# Patient Record
Sex: FEMALE | Race: WHITE | NOT HISPANIC OR LATINO
[De-identification: names, ages, dates, MRNs, and addresses within clinical notes are randomized per-mention and may not be internally consistent; named-entity substitution may affect disease eponyms.]

---

## 2017-05-12 ENCOUNTER — APPOINTMENT (OUTPATIENT)
Dept: PLASTIC SURGERY | Facility: CLINIC | Age: 62
End: 2017-05-12

## 2017-05-12 VITALS
HEIGHT: 59 IN | TEMPERATURE: 98.5 F | SYSTOLIC BLOOD PRESSURE: 122 MMHG | DIASTOLIC BLOOD PRESSURE: 84 MMHG | HEART RATE: 67 BPM | WEIGHT: 163 LBS | BODY MASS INDEX: 32.86 KG/M2

## 2017-06-09 ENCOUNTER — APPOINTMENT (OUTPATIENT)
Dept: PLASTIC SURGERY | Facility: CLINIC | Age: 62
End: 2017-06-09

## 2017-11-10 ENCOUNTER — APPOINTMENT (OUTPATIENT)
Dept: PLASTIC SURGERY | Facility: CLINIC | Age: 62
End: 2017-11-10
Payer: COMMERCIAL

## 2017-11-10 PROCEDURE — 99213 OFFICE O/P EST LOW 20 MIN: CPT | Mod: 25

## 2017-11-10 PROCEDURE — 20550 NJX 1 TENDON SHEATH/LIGAMENT: CPT | Mod: 50

## 2017-12-08 ENCOUNTER — APPOINTMENT (OUTPATIENT)
Dept: PLASTIC SURGERY | Facility: CLINIC | Age: 62
End: 2017-12-08

## 2018-02-09 ENCOUNTER — APPOINTMENT (OUTPATIENT)
Dept: PLASTIC SURGERY | Facility: CLINIC | Age: 63
End: 2018-02-09
Payer: MEDICARE

## 2018-02-09 PROCEDURE — 99213 OFFICE O/P EST LOW 20 MIN: CPT

## 2018-02-14 ENCOUNTER — APPOINTMENT (OUTPATIENT)
Dept: ORTHOPEDIC SURGERY | Facility: CLINIC | Age: 63
End: 2018-02-14
Payer: MEDICARE

## 2018-02-14 DIAGNOSIS — Z78.9 OTHER SPECIFIED HEALTH STATUS: ICD-10-CM

## 2018-02-14 DIAGNOSIS — E03.9 HYPOTHYROIDISM, UNSPECIFIED: ICD-10-CM

## 2018-02-14 DIAGNOSIS — Z82.49 FAMILY HISTORY OF ISCHEMIC HEART DISEASE AND OTHER DISEASES OF THE CIRCULATORY SYSTEM: ICD-10-CM

## 2018-02-14 DIAGNOSIS — Z82.69 FAMILY HISTORY OF OTHER DISEASES OF THE MUSCULOSKELETAL SYSTEM AND CONNECTIVE TISSUE: ICD-10-CM

## 2018-02-14 PROCEDURE — 99214 OFFICE O/P EST MOD 30 MIN: CPT

## 2018-02-14 PROCEDURE — 73564 X-RAY EXAM KNEE 4 OR MORE: CPT | Mod: RT

## 2018-03-20 ENCOUNTER — APPOINTMENT (OUTPATIENT)
Dept: ORTHOPEDIC SURGERY | Facility: CLINIC | Age: 63
End: 2018-03-20
Payer: MEDICARE

## 2018-03-20 PROCEDURE — 20610 DRAIN/INJ JOINT/BURSA W/O US: CPT | Mod: 50

## 2018-04-17 ENCOUNTER — OUTPATIENT (OUTPATIENT)
Dept: OUTPATIENT SERVICES | Facility: HOSPITAL | Age: 63
LOS: 1 days | End: 2018-04-17
Payer: MEDICARE

## 2018-04-17 VITALS
WEIGHT: 167.99 LBS | HEIGHT: 58.5 IN | TEMPERATURE: 97 F | HEART RATE: 64 BPM | OXYGEN SATURATION: 98 % | RESPIRATION RATE: 20 BRPM | DIASTOLIC BLOOD PRESSURE: 80 MMHG | SYSTOLIC BLOOD PRESSURE: 140 MMHG

## 2018-04-17 DIAGNOSIS — Z98.890 OTHER SPECIFIED POSTPROCEDURAL STATES: Chronic | ICD-10-CM

## 2018-04-17 DIAGNOSIS — M65.312 TRIGGER THUMB, LEFT THUMB: ICD-10-CM

## 2018-04-17 DIAGNOSIS — I10 ESSENTIAL (PRIMARY) HYPERTENSION: ICD-10-CM

## 2018-04-17 DIAGNOSIS — J45.909 UNSPECIFIED ASTHMA, UNCOMPLICATED: ICD-10-CM

## 2018-04-17 DIAGNOSIS — K21.9 GASTRO-ESOPHAGEAL REFLUX DISEASE WITHOUT ESOPHAGITIS: ICD-10-CM

## 2018-04-17 DIAGNOSIS — M65.30 TRIGGER FINGER, UNSPECIFIED FINGER: ICD-10-CM

## 2018-04-17 LAB
BUN SERPL-MCNC: 15 MG/DL — SIGNIFICANT CHANGE UP (ref 7–23)
CALCIUM SERPL-MCNC: 9.8 MG/DL — SIGNIFICANT CHANGE UP (ref 8.4–10.5)
CHLORIDE SERPL-SCNC: 101 MMOL/L — SIGNIFICANT CHANGE UP (ref 98–107)
CO2 SERPL-SCNC: 27 MMOL/L — SIGNIFICANT CHANGE UP (ref 22–31)
CREAT SERPL-MCNC: 0.71 MG/DL — SIGNIFICANT CHANGE UP (ref 0.5–1.3)
GLUCOSE SERPL-MCNC: 94 MG/DL — SIGNIFICANT CHANGE UP (ref 70–99)
HCT VFR BLD CALC: 38.5 % — SIGNIFICANT CHANGE UP (ref 34.5–45)
HGB BLD-MCNC: 12.6 G/DL — SIGNIFICANT CHANGE UP (ref 11.5–15.5)
MCHC RBC-ENTMCNC: 29.7 PG — SIGNIFICANT CHANGE UP (ref 27–34)
MCHC RBC-ENTMCNC: 32.7 % — SIGNIFICANT CHANGE UP (ref 32–36)
MCV RBC AUTO: 90.8 FL — SIGNIFICANT CHANGE UP (ref 80–100)
NRBC # FLD: 0 — SIGNIFICANT CHANGE UP
PLATELET # BLD AUTO: 289 K/UL — SIGNIFICANT CHANGE UP (ref 150–400)
PMV BLD: 10.5 FL — SIGNIFICANT CHANGE UP (ref 7–13)
POTASSIUM SERPL-MCNC: 4.6 MMOL/L — SIGNIFICANT CHANGE UP (ref 3.5–5.3)
POTASSIUM SERPL-SCNC: 4.6 MMOL/L — SIGNIFICANT CHANGE UP (ref 3.5–5.3)
RBC # BLD: 4.24 M/UL — SIGNIFICANT CHANGE UP (ref 3.8–5.2)
RBC # FLD: 13.5 % — SIGNIFICANT CHANGE UP (ref 10.3–14.5)
SODIUM SERPL-SCNC: 142 MMOL/L — SIGNIFICANT CHANGE UP (ref 135–145)
WBC # BLD: 4.5 K/UL — SIGNIFICANT CHANGE UP (ref 3.8–10.5)
WBC # FLD AUTO: 4.5 K/UL — SIGNIFICANT CHANGE UP (ref 3.8–10.5)

## 2018-04-17 PROCEDURE — 93010 ELECTROCARDIOGRAM REPORT: CPT

## 2018-04-17 NOTE — H&P PST ADULT - HISTORY OF PRESENT ILLNESS
Pt is a 63 y.o. female ; pt reports h/o trigger thumbs bilaterally x 1 year . Pt reports bilateral pain ; pt to surgeon ; see plan os care Pt is a 63 y.o. female ; pt reports h/o trigger thumbs bilaterally x 1 year . Pt reports bilateral pain ; pt to surgeon ; see plan of care

## 2018-04-17 NOTE — H&P PST ADULT - LYMPHATIC
posterior cervical R/anterior cervical R/supraclavicular R/anterior cervical L/posterior cervical L/supraclavicular L

## 2018-04-17 NOTE — H&P PST ADULT - PROBLEM SELECTOR PLAN 1
Procedure as booked ; Left Trigger Finger Release ; pt states " I called  surgeons office 4/16/18; I want to change it to the right"  ; call to surgeons office ; s/w Devang Siddiqi to s/w surgeon     Pre op instructions ; given to pt pt appears to have a good understanding of pre op instructions Procedure as booked ; Left Trigger Finger Release ; pt states " I called  surgeons office 4/16/18; I want to change it to the right"  ; call to surgeons office ; s/w Devang Siddiqi to s/w surgeon   Pt to sign consent with surgeon dos     Pre op instructions ; given to pt pt appears to have a good understanding of pre op instructions

## 2018-04-17 NOTE — H&P PST ADULT - PMH
Asthma  " r/t allergies"  GERD (gastroesophageal reflux disease)    HTN (hypertension)    Hypothyroidism Arthritis    Asthma  " r/t allergies"  GERD (gastroesophageal reflux disease)    HTN (hypertension)    Hypothyroidism

## 2018-04-17 NOTE — H&P PST ADULT - FAMILY HISTORY
Father  Still living? No  Family history of oral cancer, Age at diagnosis: Age Unknown     Mother  Still living? Yes, Estimated age: Age Unknown  Family history of hypertension, Age at diagnosis: Age Unknown

## 2018-04-17 NOTE — H&P PST ADULT - NSANTHOSAYNRD_GEN_A_CORE
No. FADUMO screening performed.  STOP BANG Legend: 0-2 = LOW Risk; 3-4 = INTERMEDIATE Risk; 5-8 = HIGH Risk

## 2018-04-25 ENCOUNTER — TRANSCRIPTION ENCOUNTER (OUTPATIENT)
Age: 63
End: 2018-04-25

## 2018-04-26 ENCOUNTER — OUTPATIENT (OUTPATIENT)
Dept: OUTPATIENT SERVICES | Facility: HOSPITAL | Age: 63
LOS: 1 days | Discharge: HOME | End: 2018-04-26
Payer: MEDICARE

## 2018-04-26 VITALS
TEMPERATURE: 98 F | RESPIRATION RATE: 18 BRPM | HEIGHT: 58.5 IN | SYSTOLIC BLOOD PRESSURE: 118 MMHG | WEIGHT: 167.99 LBS | HEART RATE: 71 BPM | OXYGEN SATURATION: 97 % | DIASTOLIC BLOOD PRESSURE: 67 MMHG

## 2018-04-26 VITALS
RESPIRATION RATE: 48 BRPM | HEART RATE: 73 BPM | DIASTOLIC BLOOD PRESSURE: 71 MMHG | SYSTOLIC BLOOD PRESSURE: 104 MMHG | OXYGEN SATURATION: 97 %

## 2018-04-26 DIAGNOSIS — M65.312 TRIGGER THUMB, LEFT THUMB: ICD-10-CM

## 2018-04-26 DIAGNOSIS — Z98.890 OTHER SPECIFIED POSTPROCEDURAL STATES: Chronic | ICD-10-CM

## 2018-04-26 PROCEDURE — 26055 INCISE FINGER TENDON SHEATH: CPT | Mod: FA

## 2018-04-26 NOTE — ASU DISCHARGE PLAN (ADULT/PEDIATRIC). - NOTIFY
Bleeding that does not stop/Pain not relieved by Medications/Swelling that continues/Numbness, color, or temperature change to extremity/Fever greater than 101

## 2018-05-04 ENCOUNTER — TRANSCRIPTION ENCOUNTER (OUTPATIENT)
Age: 63
End: 2018-05-04

## 2018-05-04 ENCOUNTER — APPOINTMENT (OUTPATIENT)
Dept: PLASTIC SURGERY | Facility: CLINIC | Age: 63
End: 2018-05-04
Payer: MEDICARE

## 2018-05-04 PROCEDURE — 99024 POSTOP FOLLOW-UP VISIT: CPT

## 2018-05-11 ENCOUNTER — APPOINTMENT (OUTPATIENT)
Dept: PLASTIC SURGERY | Facility: CLINIC | Age: 63
End: 2018-05-11
Payer: MEDICARE

## 2018-05-11 PROCEDURE — 99024 POSTOP FOLLOW-UP VISIT: CPT

## 2018-06-08 ENCOUNTER — APPOINTMENT (OUTPATIENT)
Dept: PLASTIC SURGERY | Facility: CLINIC | Age: 63
End: 2018-06-08

## 2018-06-11 ENCOUNTER — OUTPATIENT (OUTPATIENT)
Dept: OUTPATIENT SERVICES | Facility: HOSPITAL | Age: 63
LOS: 1 days | End: 2018-06-11
Payer: MEDICARE

## 2018-06-11 ENCOUNTER — APPOINTMENT (OUTPATIENT)
Dept: MAMMOGRAPHY | Facility: IMAGING CENTER | Age: 63
End: 2018-06-11
Payer: MEDICARE

## 2018-06-11 DIAGNOSIS — Z98.890 OTHER SPECIFIED POSTPROCEDURAL STATES: Chronic | ICD-10-CM

## 2018-06-11 DIAGNOSIS — Z00.8 ENCOUNTER FOR OTHER GENERAL EXAMINATION: ICD-10-CM

## 2018-06-11 PROCEDURE — 77067 SCR MAMMO BI INCL CAD: CPT

## 2018-06-11 PROCEDURE — 77067 SCR MAMMO BI INCL CAD: CPT | Mod: 26

## 2018-06-11 PROCEDURE — 77063 BREAST TOMOSYNTHESIS BI: CPT

## 2018-06-11 PROCEDURE — 77063 BREAST TOMOSYNTHESIS BI: CPT | Mod: 26

## 2018-07-31 PROBLEM — M19.90 UNSPECIFIED OSTEOARTHRITIS, UNSPECIFIED SITE: Chronic | Status: ACTIVE | Noted: 2018-04-17

## 2018-07-31 PROBLEM — K21.9 GASTRO-ESOPHAGEAL REFLUX DISEASE WITHOUT ESOPHAGITIS: Chronic | Status: ACTIVE | Noted: 2018-04-17

## 2018-07-31 PROBLEM — J45.909 UNSPECIFIED ASTHMA, UNCOMPLICATED: Chronic | Status: ACTIVE | Noted: 2018-04-17

## 2018-07-31 PROBLEM — I10 ESSENTIAL (PRIMARY) HYPERTENSION: Chronic | Status: ACTIVE | Noted: 2018-04-17

## 2018-07-31 PROBLEM — E03.9 HYPOTHYROIDISM, UNSPECIFIED: Chronic | Status: ACTIVE | Noted: 2018-04-17

## 2018-08-08 ENCOUNTER — APPOINTMENT (OUTPATIENT)
Dept: ULTRASOUND IMAGING | Facility: HOSPITAL | Age: 63
End: 2018-08-08
Payer: MEDICARE

## 2018-08-08 ENCOUNTER — OUTPATIENT (OUTPATIENT)
Dept: OUTPATIENT SERVICES | Facility: HOSPITAL | Age: 63
LOS: 1 days | End: 2018-08-08
Payer: MEDICARE

## 2018-08-08 DIAGNOSIS — I10 ESSENTIAL (PRIMARY) HYPERTENSION: ICD-10-CM

## 2018-08-08 DIAGNOSIS — R00.2 PALPITATIONS: ICD-10-CM

## 2018-08-08 DIAGNOSIS — Z00.00 ENCOUNTER FOR GENERAL ADULT MEDICAL EXAMINATION WITHOUT ABNORMAL FINDINGS: ICD-10-CM

## 2018-08-08 DIAGNOSIS — E78.5 HYPERLIPIDEMIA, UNSPECIFIED: ICD-10-CM

## 2018-08-08 DIAGNOSIS — Z98.890 OTHER SPECIFIED POSTPROCEDURAL STATES: Chronic | ICD-10-CM

## 2018-08-08 PROCEDURE — 93272 ECG/REVIEW INTERPRET ONLY: CPT

## 2018-08-08 PROCEDURE — 93880 EXTRACRANIAL BILAT STUDY: CPT | Mod: 26

## 2018-08-08 PROCEDURE — 93880 EXTRACRANIAL BILAT STUDY: CPT

## 2018-08-14 ENCOUNTER — FORM ENCOUNTER (OUTPATIENT)
Age: 63
End: 2018-08-14

## 2018-09-25 ENCOUNTER — APPOINTMENT (OUTPATIENT)
Dept: PLASTIC SURGERY | Facility: CLINIC | Age: 63
End: 2018-09-25
Payer: MEDICARE

## 2018-09-25 DIAGNOSIS — M65.311 TRIGGER THUMB, RIGHT THUMB: ICD-10-CM

## 2018-09-25 PROCEDURE — 11900 INJECT SKIN LESIONS </W 7: CPT

## 2018-09-25 PROCEDURE — 99212 OFFICE O/P EST SF 10 MIN: CPT | Mod: 25

## 2018-09-26 PROBLEM — M65.311 TRIGGER FINGER OF RIGHT THUMB: Status: ACTIVE | Noted: 2017-05-15

## 2018-09-26 RX ORDER — MONTELUKAST 10 MG/1
10 TABLET, FILM COATED ORAL
Qty: 30 | Refills: 0 | Status: ACTIVE | COMMUNITY
Start: 2018-09-04

## 2018-09-26 RX ORDER — OMEPRAZOLE 20 MG/1
20 CAPSULE, DELAYED RELEASE ORAL
Qty: 30 | Refills: 0 | Status: ACTIVE | COMMUNITY
Start: 2018-07-20

## 2018-09-26 RX ORDER — ALBUTEROL SULFATE 90 UG/1
108 (90 BASE) AEROSOL, METERED RESPIRATORY (INHALATION)
Qty: 8 | Refills: 0 | Status: ACTIVE | COMMUNITY
Start: 2018-07-20

## 2018-09-26 RX ORDER — LOSARTAN POTASSIUM 50 MG/1
50 TABLET, FILM COATED ORAL
Qty: 90 | Refills: 0 | Status: ACTIVE | COMMUNITY
Start: 2018-09-06

## 2018-09-26 RX ORDER — VALSARTAN 80 MG/1
80 TABLET, COATED ORAL
Qty: 90 | Refills: 0 | Status: ACTIVE | COMMUNITY
Start: 2018-05-24

## 2018-09-26 RX ORDER — LEVOTHYROXINE SODIUM 0.05 MG/1
50 TABLET ORAL
Qty: 90 | Refills: 0 | Status: ACTIVE | COMMUNITY
Start: 2018-09-04

## 2018-09-26 RX ORDER — FLUTICASONE PROPIONATE 110 UG/1
110 AEROSOL, METERED RESPIRATORY (INHALATION)
Qty: 12 | Refills: 0 | Status: ACTIVE | COMMUNITY
Start: 2018-07-20

## 2018-09-26 RX ORDER — OXYCODONE AND ACETAMINOPHEN 5; 325 MG/1; MG/1
5-325 TABLET ORAL
Qty: 20 | Refills: 0 | Status: ACTIVE | COMMUNITY
Start: 2018-04-26

## 2018-10-09 NOTE — H&P PST ADULT - LIVES WITH, PROFILE
yes/Pt. profile reviewed, consulted with KYLEE MACK prior to initial PT evaluation and tx, as per RN, Pt. is OK to participate in skilled therapy session.
spouse

## 2018-11-02 ENCOUNTER — APPOINTMENT (OUTPATIENT)
Dept: PLASTIC SURGERY | Facility: CLINIC | Age: 63
End: 2018-11-02
Payer: MEDICARE

## 2018-11-02 DIAGNOSIS — M65.312 TRIGGER THUMB, RIGHT THUMB: ICD-10-CM

## 2018-11-02 DIAGNOSIS — M65.311 TRIGGER THUMB, RIGHT THUMB: ICD-10-CM

## 2018-11-02 DIAGNOSIS — M65.312 TRIGGER THUMB, LEFT THUMB: ICD-10-CM

## 2018-11-02 DIAGNOSIS — L91.0 HYPERTROPHIC SCAR: ICD-10-CM

## 2018-11-02 PROCEDURE — 99212 OFFICE O/P EST SF 10 MIN: CPT | Mod: 25

## 2018-11-02 PROCEDURE — 11900 INJECT SKIN LESIONS </W 7: CPT

## 2018-11-20 PROBLEM — L91.0 HYPERTROPHIC SCAR: Status: ACTIVE | Noted: 2018-09-26

## 2018-11-20 PROBLEM — M65.311 BILATERAL TRIGGER THUMB: Status: RESOLVED | Noted: 2017-05-18 | Resolved: 2018-11-20

## 2018-11-20 PROBLEM — M65.311 TRIGGER THUMB OF BOTH HANDS: Status: RESOLVED | Noted: 2017-05-12 | Resolved: 2018-11-20

## 2018-11-20 PROBLEM — M65.312 TRIGGER FINGER OF LEFT THUMB: Status: RESOLVED | Noted: 2017-05-15 | Resolved: 2018-11-20

## 2018-12-14 ENCOUNTER — APPOINTMENT (OUTPATIENT)
Dept: PLASTIC SURGERY | Facility: CLINIC | Age: 63
End: 2018-12-14

## 2018-12-28 ENCOUNTER — OUTPATIENT (OUTPATIENT)
Dept: OUTPATIENT SERVICES | Facility: HOSPITAL | Age: 63
LOS: 1 days | End: 2018-12-28
Payer: MEDICARE

## 2018-12-28 ENCOUNTER — APPOINTMENT (OUTPATIENT)
Dept: RADIOLOGY | Facility: IMAGING CENTER | Age: 63
End: 2018-12-28
Payer: MEDICARE

## 2018-12-28 DIAGNOSIS — Z98.890 OTHER SPECIFIED POSTPROCEDURAL STATES: Chronic | ICD-10-CM

## 2018-12-28 DIAGNOSIS — Z00.8 ENCOUNTER FOR OTHER GENERAL EXAMINATION: ICD-10-CM

## 2018-12-28 PROCEDURE — 77080 DXA BONE DENSITY AXIAL: CPT

## 2018-12-28 PROCEDURE — 77080 DXA BONE DENSITY AXIAL: CPT | Mod: 26

## 2019-01-09 ENCOUNTER — APPOINTMENT (OUTPATIENT)
Dept: ORTHOPEDIC SURGERY | Facility: CLINIC | Age: 64
End: 2019-01-09
Payer: MEDICARE

## 2019-01-09 PROCEDURE — 20610 DRAIN/INJ JOINT/BURSA W/O US: CPT | Mod: 50

## 2019-01-09 PROCEDURE — 99213 OFFICE O/P EST LOW 20 MIN: CPT | Mod: 25

## 2019-01-09 NOTE — DISCUSSION/SUMMARY
[de-identified] : She elected to receive a steroid injection to both knees today, and she tolerated them well.

## 2019-01-09 NOTE — HISTORY OF PRESENT ILLNESS
[All Other ROS Normal] : All other review of systems are negative except as noted [Joint Pain] : joint pain [Joint Stiffness] : no joint stiffness [Joint Swelling] : no joint swelling [Muscle Aches] : no muscle aches [FreeTextEntry1] : Right and left knee pain [FreeTextEntry2] : Followup right and left knee pain secondary to arthritis has been undergoing periodic steroid injections with sustained relief. Complains of recurrent chronic intermittent pain right and left knee with weightbearing to no swelling locking giving out

## 2019-01-09 NOTE — PHYSICAL EXAM
[FreeTextEntry2] : Well-nourished female, in no acute distress\par Alert and oriented to time, place and person\par Skin: no lesions discoloration\par Respirations: unlabored\par Cardiac: no leg swelling\par Lymphatic: no groin adenopathy \par Right knee: No effusion flexion 0/110 with crepitus Ligaments intact\par Left knee: No effusion flexion 0/110 with crepitus Ligaments intact

## 2019-01-09 NOTE — ADDENDUM
[FreeTextEntry1] : I, Jason Brittnee, acted solely as a scribe for Dr. Sarwat Gaines on 01/09/2019 .\par \par All medical record entries made by the scribe were at my, Dr. Sarwat Gaines, direction and personally dictated by me on 01/09/2019 . I have reviewed the chart and agree that the record accurately reflects my personal performance of the history, physical exam, assessment and plan. I have also personally directed, reviewed, and agreed with the chart.

## 2019-06-12 ENCOUNTER — APPOINTMENT (OUTPATIENT)
Dept: MAMMOGRAPHY | Facility: IMAGING CENTER | Age: 64
End: 2019-06-12
Payer: MEDICARE

## 2019-06-12 ENCOUNTER — OUTPATIENT (OUTPATIENT)
Dept: OUTPATIENT SERVICES | Facility: HOSPITAL | Age: 64
LOS: 1 days | End: 2019-06-12
Payer: MEDICARE

## 2019-06-12 DIAGNOSIS — Z00.8 ENCOUNTER FOR OTHER GENERAL EXAMINATION: ICD-10-CM

## 2019-06-12 DIAGNOSIS — Z98.890 OTHER SPECIFIED POSTPROCEDURAL STATES: Chronic | ICD-10-CM

## 2019-06-12 PROCEDURE — 77067 SCR MAMMO BI INCL CAD: CPT | Mod: 26

## 2019-06-12 PROCEDURE — 77063 BREAST TOMOSYNTHESIS BI: CPT

## 2019-06-12 PROCEDURE — 77063 BREAST TOMOSYNTHESIS BI: CPT | Mod: 26

## 2019-06-12 PROCEDURE — 77067 SCR MAMMO BI INCL CAD: CPT

## 2019-08-07 ENCOUNTER — TRANSCRIPTION ENCOUNTER (OUTPATIENT)
Age: 64
End: 2019-08-07

## 2019-08-09 ENCOUNTER — APPOINTMENT (OUTPATIENT)
Dept: ORTHOPEDIC SURGERY | Facility: CLINIC | Age: 64
End: 2019-08-09
Payer: MEDICARE

## 2019-08-09 VITALS — WEIGHT: 163 LBS | HEIGHT: 59 IN | BODY MASS INDEX: 32.86 KG/M2

## 2019-08-09 PROCEDURE — 20610 DRAIN/INJ JOINT/BURSA W/O US: CPT | Mod: 50

## 2019-08-09 NOTE — ADDENDUM
[FreeTextEntry1] : I, Adria Morrow, acted solely as a scribe for Dr. Sarwat Gaines on 08/09/2019  .\par  \par All medical record entries made by the scribe were at my, Dr. Sarwat Gaines, direction and personally dictated by me on 08/09/2019. I have reviewed the chart and agree that the record accurately reflects my personal performance of the history, physical exam, assessment and plan. I have also personally directed, reviewed, and agreed with the chart.\par

## 2019-08-09 NOTE — PHYSICAL EXAM
[de-identified] : Well-nourished, in no acute distress\par Alert and oriented to time, place and person\par Skin: no lesions discoloration\par Respirations: unlabored\par Cardiac: no leg swelling\par Lymphatic: no groin adenopathy\par right and left knee: varus alignment, peripheral pulses intact, flexion 0-100 degrees, ligaments intact [de-identified] : No new images were taken in the office today.\par  [FreeTextEntry2] : Well-nourished female, in no acute distress\par Alert and oriented to time, place and person\par Skin: no lesions discoloration\par Respirations: unlabored\par Cardiac: no leg swelling\par Lymphatic: no groin adenopathy \par Right knee: No effusion flexion 0/110 with crepitus Ligaments intact\par Left knee: No effusion flexion 0/110 with crepitus Ligaments intact

## 2019-08-09 NOTE — PROCEDURE
[de-identified] : Right and left knee sterilely prepped for her injection 8 cc Xylocaine 1 cc Kenalog. Well tolerated

## 2019-08-09 NOTE — HISTORY OF PRESENT ILLNESS
[All Other ROS Normal] : All other review of systems are negative except as noted [Joint Pain] : joint pain [de-identified] : 63 year old female presents with a longstanding history of  recurrent bilateral knee pain secondary to osteoarthritis. She continues with medial pain to both knees that worsens with walking and climbing stairs. History of - cortisone injection in January of 2019 that provided sustained relief of pain in the bilateral knees.  Reports no swelling, locking or giving out.  [Joint Stiffness] : no joint stiffness [Joint Swelling] : no joint swelling [Muscle Aches] : no muscle aches [FreeTextEntry2] : 63 year old female presents with a longstanding history of bilateral knee pain secondary to osteoarthritis. She continues with medial pain to both knees that worsens with walking and climbing stairs. She saw Dr. Munoz in August, and had cortisone injections, but she did had minimal relief of pain. She has also had hyaluronic acid injections in the past as well. She is going on a trip, and wants to consider steroid injections today.  [FreeTextEntry1] : Right and left knee pain

## 2020-06-15 ENCOUNTER — OUTPATIENT (OUTPATIENT)
Dept: OUTPATIENT SERVICES | Facility: HOSPITAL | Age: 65
LOS: 1 days | End: 2020-06-15
Payer: MEDICARE

## 2020-06-15 ENCOUNTER — APPOINTMENT (OUTPATIENT)
Dept: MAMMOGRAPHY | Facility: IMAGING CENTER | Age: 65
End: 2020-06-15
Payer: MEDICARE

## 2020-06-15 DIAGNOSIS — Z00.8 ENCOUNTER FOR OTHER GENERAL EXAMINATION: ICD-10-CM

## 2020-06-15 DIAGNOSIS — Z98.890 OTHER SPECIFIED POSTPROCEDURAL STATES: Chronic | ICD-10-CM

## 2020-06-15 PROCEDURE — 77063 BREAST TOMOSYNTHESIS BI: CPT | Mod: 26

## 2020-06-15 PROCEDURE — 77067 SCR MAMMO BI INCL CAD: CPT | Mod: 26

## 2020-06-15 PROCEDURE — 77063 BREAST TOMOSYNTHESIS BI: CPT

## 2020-06-15 PROCEDURE — 77067 SCR MAMMO BI INCL CAD: CPT

## 2020-06-17 ENCOUNTER — APPOINTMENT (OUTPATIENT)
Dept: ORTHOPEDIC SURGERY | Facility: CLINIC | Age: 65
End: 2020-06-17
Payer: MEDICARE

## 2020-06-17 DIAGNOSIS — M17.12 UNILATERAL PRIMARY OSTEOARTHRITIS, LEFT KNEE: ICD-10-CM

## 2020-06-17 PROCEDURE — 99213 OFFICE O/P EST LOW 20 MIN: CPT

## 2020-06-17 PROCEDURE — 73564 X-RAY EXAM KNEE 4 OR MORE: CPT | Mod: 50

## 2020-06-17 RX ORDER — DICLOFENAC SODIUM AND MISOPROSTOL 75; 200 MG/1; UG/1
75-0.2 TABLET, DELAYED RELEASE ORAL
Qty: 60 | Refills: 1 | Status: ACTIVE | COMMUNITY
Start: 2020-06-17 | End: 1900-01-01

## 2020-06-17 NOTE — DISCUSSION/SUMMARY
[Medication Risks Reviewed] : Medication risks reviewed [Surgical risks reviewed] : Surgical risks reviewed [de-identified] : Impression: end stage osteoarthritis bilateral knees\par Plan: Trial of diclofenac, followup if not improved for steroid injections as per patient's request. I have advised patient she is a candidate for total knee replacement

## 2020-06-17 NOTE — ADDENDUM
[FreeTextEntry1] : I, Triny Ji ATC, assisted with the history and documentation for Dr. Gaines on this date 06/17/2020.\par

## 2020-06-17 NOTE — HISTORY OF PRESENT ILLNESS
[All Other ROS Normal] : All other review of systems are negative except as noted [Joint Pain] : joint pain [de-identified] : 63 year old female presents with a longstanding history of  recurrent bilateral knee pain secondary to osteoarthritis. She continues to have medial pain to both knees that worsens with walking and climbing stairs. History of cortisone injections in January of 2019 and August 2019. The injections only give her a few weeks of symptom relief each time. [Joint Stiffness] : no joint stiffness [Joint Swelling] : no joint swelling [Muscle Aches] : no muscle aches

## 2020-06-17 NOTE — PHYSICAL EXAM
[de-identified] : Well-nourished, in no acute distress\par Alert and oriented to time, place and person\par Skin: no lesions discoloration\par Respirations: unlabored\par Cardiac: no leg swelling\par Lymphatic: no groin adenopathy\par Motor power: Peroneals, EHL, and tibialis anterior strength 5/5 bilaterally\par Right knee: neutral no effusion 0/110, ligaments intact\par Left knee: neutral no effusion 0/115, ligaments intact [de-identified] : Xray 4 views L and R knees: bilateral osteoarthritis, bone on bone, subchondral sclerosis, varus alignment

## 2020-12-29 ENCOUNTER — APPOINTMENT (OUTPATIENT)
Dept: RADIOLOGY | Facility: IMAGING CENTER | Age: 65
End: 2020-12-29
Payer: MEDICARE

## 2020-12-29 ENCOUNTER — OUTPATIENT (OUTPATIENT)
Dept: OUTPATIENT SERVICES | Facility: HOSPITAL | Age: 65
LOS: 1 days | End: 2020-12-29
Payer: MEDICARE

## 2020-12-29 DIAGNOSIS — Z98.890 OTHER SPECIFIED POSTPROCEDURAL STATES: Chronic | ICD-10-CM

## 2020-12-29 DIAGNOSIS — Z00.8 ENCOUNTER FOR OTHER GENERAL EXAMINATION: ICD-10-CM

## 2020-12-29 PROCEDURE — 77080 DXA BONE DENSITY AXIAL: CPT

## 2020-12-29 PROCEDURE — 77080 DXA BONE DENSITY AXIAL: CPT | Mod: 26

## 2021-02-12 ENCOUNTER — OUTPATIENT (OUTPATIENT)
Dept: OUTPATIENT SERVICES | Facility: HOSPITAL | Age: 66
LOS: 1 days | End: 2021-02-12
Payer: MEDICARE

## 2021-02-12 ENCOUNTER — APPOINTMENT (OUTPATIENT)
Dept: ULTRASOUND IMAGING | Facility: IMAGING CENTER | Age: 66
End: 2021-02-12
Payer: MEDICARE

## 2021-02-12 ENCOUNTER — APPOINTMENT (OUTPATIENT)
Dept: MAMMOGRAPHY | Facility: IMAGING CENTER | Age: 66
End: 2021-02-12
Payer: MEDICARE

## 2021-02-12 DIAGNOSIS — Z98.890 OTHER SPECIFIED POSTPROCEDURAL STATES: Chronic | ICD-10-CM

## 2021-02-12 DIAGNOSIS — Z12.39 ENCOUNTER FOR OTHER SCREENING FOR MALIGNANT NEOPLASM OF BREAST: ICD-10-CM

## 2021-02-12 DIAGNOSIS — Z12.31 ENCOUNTER FOR SCREENING MAMMOGRAM FOR MALIGNANT NEOPLASM OF BREAST: ICD-10-CM

## 2021-02-12 DIAGNOSIS — Z00.8 ENCOUNTER FOR OTHER GENERAL EXAMINATION: ICD-10-CM

## 2021-02-12 PROCEDURE — G0279: CPT | Mod: 26

## 2021-02-12 PROCEDURE — 76641 ULTRASOUND BREAST COMPLETE: CPT | Mod: 26,50

## 2021-02-12 PROCEDURE — 77065 DX MAMMO INCL CAD UNI: CPT

## 2021-02-12 PROCEDURE — G0279: CPT

## 2021-02-12 PROCEDURE — 77065 DX MAMMO INCL CAD UNI: CPT | Mod: 26,RT

## 2021-02-12 PROCEDURE — 76641 ULTRASOUND BREAST COMPLETE: CPT

## 2021-02-12 PROCEDURE — 77066 DX MAMMO INCL CAD BI: CPT

## 2021-03-22 ENCOUNTER — APPOINTMENT (OUTPATIENT)
Dept: RADIOLOGY | Facility: IMAGING CENTER | Age: 66
End: 2021-03-22

## 2021-04-20 NOTE — REASON FOR VISIT
CC:  Diagnoses of Abdominal fullness, Other form of dyspnea, Other sleep apnea, Left flank pain, Nonrheumatic aortic valve insufficiency, Generalized abdominal pain, Screening for colon cancer, and Muscle spasm were pertinent to this visit.    HISTORY OF THE PRESENT ILLNESS: Patient is a 59 y.o. female. This pleasant patient is here today follow-up ER visit.    ER note from 4/14/2021 reviewed.  Patient states after her Covid vaccine she had  fever (101) which did resolve.  Though then she says she developed dyspnea, fatigue, sense of abdominal distention/bloating, cough.  Work-up in ER was not very remarkable.  Chest x-ray within normal limits.  ECG normal sinus rhythm without any acute changes suggestive of ischemia.  Labs showed GFR 53 with prior range 50-60.  Reasonable CBC and CMP, negative troponin, normal D-dimer 0.28.  Prior to this on 4/13/2021 she did have E. coli urine [id number] which was pansensitive and she did complete antibiotic therapy for this; and at this time also tested negative for Covid.    She says dyspnea continues throughout the day, even talking makes her dyspneic.  Prior to her Covid vaccine she said she did a 4 mile hike and felt fine so this is a significant change for her.  Echocardiogram in August did show moderate aortic regurgitation.  She states compliance with her new diagnosis of sleep apnea with CPAP treatment.  Denies wheeze, leg edema, PND orthopnea.  Does have fatigue over this timeframe.  Has chronic dry cough sometimes some scant clear mucus.  Using cough suppressant as needed.  More remotely she had CT-CTA chest 8/29/2020 showing bilateral peripheral lung nodules thought to be postinflammatory and she has followed with pulmonary regarding this.    With the abdominal distention sensation seems to be concentrated over the left lower region.  She says her interval surveillance colonoscopy is probably due this year.  States bowel movements are daily generally soft and brown and  no blood.  Denies nausea or vomiting.  Has chronic GERD controlled on current therapy.  No dysphagia, nausea, vomiting, fevers.  She describes the distention as constant sense of feeling like she needs to use the restroom.  More remote CT abdomen and pelvis 8/6/2019 did show signs hepatic steatosis and hepatomegaly as well as prior cholecystectomy.  Does have history of recurrent episodes of zoster but has had no rash in this distribution.  Does have some pain radiating however from the left abdominal region towards left flank area.  She has constant sense of needing to urinate but no blood in urine, no dysuria, no bladder fullness.  Again she did complete antibiotics for UTI as above.    Allergies: Patient has no known allergies.    Current Outpatient Medications Ordered in Epic   Medication Sig Dispense Refill   • baclofen (LIORESAL) 5 MG Tab Take 1-2 Tablets by mouth 3 times a day as needed for up to 30 days. 30 tablet 1   • fluticasone (FLOVENT HFA) 44 MCG/ACT Aerosol Inhale 1 Puff 2 times a day. 1 Each 3   • albuterol 108 (90 Base) MCG/ACT Aero Soln inhalation aerosol Inhale 2 Puffs every four hours as needed for Shortness of Breath. 1 Each 3   • ibuprofen (MOTRIN) 200 MG Tab Take 400 mg by mouth every 6 hours as needed for Mild Pain.     • benzonatate (TESSALON) 100 MG Cap Take 2 Capsules by mouth 3 times a day as needed. (Patient taking differently: Take 200 mg by mouth 3 times a day as needed for Cough.) 60 capsule 0   • traZODone (DESYREL) 50 MG Tab TAKE 1 TABLET BY MOUTH AT BEDTIME AS NEEDED (Patient taking differently: Take 50 mg by mouth at bedtime.) 90 tablet 1   • omeprazole (PRILOSEC) 40 MG delayed-release capsule Take 1 Cap by mouth as needed. Indications: Heartburn 90 Cap 3   • Cyanocobalamin (B-12 PO) Take 1 tablet by mouth every evening.     • acetaminophen (TYLENOL) 500 MG Tab Take 1,000 mg by mouth every 6 hours as needed for Moderate Pain.     • therapeutic multivitamin-minerals (THERAGRAN-M)  "Tab Take 1 tablet by mouth every evening.       No current Epic-ordered facility-administered medications on file.       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   • OTHER ORTHOPEDIC SURGERY      b/l knee replacements ,    • TONSILLECTOMY         Social History     Tobacco Use   • Smoking status: Former Smoker     Packs/day: 2.00     Years: 16.00     Pack years: 32.00     Quit date:      Years since quittin.3   • Smokeless tobacco: Never Used   Substance Use Topics   • Alcohol use: Not Currently     Comment: RARELY   • Drug use: Not Currently     Types: Inhaled     Comment: Marijuana       Social History     Social History Narrative   • Not on file       Family History   Problem Relation Age of Onset   • Arthritis Mother    • Cancer Mother         skin   • Anemia Mother    • Hypertension Mother    • Obesity Mother    • Other Mother         ulcer   • Cancer Father         skin, prostate, esophageal   • Hypertension Father    • Obesity Father    • Arthritis Brother    • Cancer Maternal Aunt         breast]   • Sleep Apnea Neg Hx        Exam: /74 (BP Location: Right arm, Patient Position: Sitting)   Pulse 96   Temp 36.2 °C (97.2 °F) (Temporal)   Resp 16   Ht 1.676 m (5' 6\")   Wt 106 kg (233 lb)   SpO2 96%  Body mass index is 37.61 kg/m².    General: Normal appearing. No distress.  EYES: Conjunctiva clear lids without " ptosis, pupils equal  EARS: Normal shape and contour   Pulmonary: Clear to ausculation.  Normal effort. No rales or wheezing.  Cardiovascular: Regular rate and rhythm   Abdomen: Soft, I do not appreciate focal distention.  No rebound/guarding/rigidity, bowel sounds present, there was tenderness palpating moderately in epigastric, mid abdomen and throughout the lower quadrants.  Liver auscultated to be enlarged about 2 cm below costal's, difficult for me to palpate spleen.  No fluid wave appreciated.  No rash or skin changes.  Neurologic: Cranial nerves grossly nonfocal  Skin: Warm and dry.  No obvious lesions.  Musculoskeletal: Normal gait. No extremity cyanosis, clubbing, or edema.  Palpating back there is muscle tension focally left lower and no skin rash.  Psych: Normal mood and affect. Alert and oriented x3. Judgment and insight is normal.      Assessment/Plan  1. Abdominal fullness  Unclear cause of her symptoms.  Plan to check abdominal imaging to rule out any significant splenomegaly, new mass, etc.  Has regular bowel movements but KUB may be helpful to evaluate for stool burden.  She is due for interval surveillance colonoscopy, referral to GI as well.  - US-ABDOMEN COMPLETE SURVEY; Future  - URINALYSIS; Future  - URINE CULTURE(NEW); Future    2. Other form of dyspnea  Plan to evaluate symptoms further and empiric trial of inhalers.  Will reevaluate her aortic regurgitation which was moderate on prior echocardiogram.  Lung function testing to see if she has any signs of obstructive or restrictive airway disease.  Known to pulmonary team she may follow-up for additional recommendations.  ER shows negative D-dimer, pulmonary embolus is not thought to be very likely.  - PULMONARY FUNCTION TESTS -Test requested: Spirometry with-out & with Bronchodilator; Future  - REFERRAL TO PULMONARY AND SLEEP MEDICINE  - fluticasone (FLOVENT HFA) 44 MCG/ACT Aerosol; Inhale 1 Puff 2 times a day.  Dispense: 1 Each; Refill: 3  -  albuterol 108 (90 Base) MCG/ACT Aero Soln inhalation aerosol; Inhale 2 Puffs every four hours as needed for Shortness of Breath.  Dispense: 1 Each; Refill: 3    3. Other sleep apnea  Continue CPAP and follow-up pulmonary team    4. Left flank pain  On exam she did have some focal muscle spasm on the left side.  We will try muscle relaxer.  But with her symptoms radiating from her left pain through her abdomen and urinary frequency will perform KUB evaluate for any occult stone, stool burden causing abdominal distention, etc.  - [id number] VIEW; Future    5. Nonrheumatic aortic valve insufficiency  Repeat echocardiogram as above  - EC-ECHOCARDIOGRAM COMPLETE W/O CONT; Future    6. Generalized abdominal pain  See discussion above  - REFERRAL TO GASTROENTEROLOGY    7. Screening for colon cancer  Patient states due for interval surveillance colonoscopy  - REFERRAL TO GASTROENTEROLOGY    8. Muscle spasm  Patient aware that muscle relaxers can be sedating do not mix alcohol, driving, machinery, etc. she expressed understanding.  She has taken baclofen in the past.  - baclofen (LIORESAL) 5 MG Tab; Take 1-2 Tablets by mouth 3 times a day as needed for up to 30 days.  Dispense: 30 tablet; Refill: 1    RTC 2 weeks review interval results          Please note that this dictation was created using voice recognition software. I have made every reasonable attempt to correct obvious errors, but I expect that there are errors of grammar and possibly content that I did not discover before finalizing the note.     [Follow-Up Visit] : a follow-up visit for [Knee Pain] : knee pain [FreeTextEntry2] :  pain right and left knees

## 2021-06-09 ENCOUNTER — APPOINTMENT (OUTPATIENT)
Dept: MRI IMAGING | Facility: CLINIC | Age: 66
End: 2021-06-09
Payer: MEDICARE

## 2021-06-09 ENCOUNTER — OUTPATIENT (OUTPATIENT)
Dept: OUTPATIENT SERVICES | Facility: HOSPITAL | Age: 66
LOS: 1 days | End: 2021-06-09
Payer: MEDICARE

## 2021-06-09 DIAGNOSIS — M54.5 LOW BACK PAIN: ICD-10-CM

## 2021-06-09 DIAGNOSIS — Z98.890 OTHER SPECIFIED POSTPROCEDURAL STATES: Chronic | ICD-10-CM

## 2021-06-09 PROCEDURE — 72148 MRI LUMBAR SPINE W/O DYE: CPT

## 2021-06-09 PROCEDURE — 72148 MRI LUMBAR SPINE W/O DYE: CPT | Mod: 26,MH

## 2022-02-14 ENCOUNTER — APPOINTMENT (OUTPATIENT)
Dept: ULTRASOUND IMAGING | Facility: IMAGING CENTER | Age: 67
End: 2022-02-14
Payer: MEDICARE

## 2022-02-14 ENCOUNTER — OUTPATIENT (OUTPATIENT)
Dept: OUTPATIENT SERVICES | Facility: HOSPITAL | Age: 67
LOS: 1 days | End: 2022-02-14
Payer: MEDICARE

## 2022-02-14 ENCOUNTER — APPOINTMENT (OUTPATIENT)
Dept: MAMMOGRAPHY | Facility: IMAGING CENTER | Age: 67
End: 2022-02-14
Payer: MEDICARE

## 2022-02-14 DIAGNOSIS — Z98.890 OTHER SPECIFIED POSTPROCEDURAL STATES: Chronic | ICD-10-CM

## 2022-02-14 DIAGNOSIS — Z00.8 ENCOUNTER FOR OTHER GENERAL EXAMINATION: ICD-10-CM

## 2022-02-14 PROCEDURE — 77063 BREAST TOMOSYNTHESIS BI: CPT

## 2022-02-14 PROCEDURE — 77063 BREAST TOMOSYNTHESIS BI: CPT | Mod: 26

## 2022-02-14 PROCEDURE — 76641 ULTRASOUND BREAST COMPLETE: CPT | Mod: 26,50

## 2022-02-14 PROCEDURE — 77067 SCR MAMMO BI INCL CAD: CPT

## 2022-02-14 PROCEDURE — 77067 SCR MAMMO BI INCL CAD: CPT | Mod: 26

## 2022-02-14 PROCEDURE — 76641 ULTRASOUND BREAST COMPLETE: CPT

## 2022-12-30 ENCOUNTER — APPOINTMENT (OUTPATIENT)
Dept: RADIOLOGY | Facility: IMAGING CENTER | Age: 67
End: 2022-12-30
Payer: MEDICARE

## 2022-12-30 ENCOUNTER — OUTPATIENT (OUTPATIENT)
Dept: OUTPATIENT SERVICES | Facility: HOSPITAL | Age: 67
LOS: 1 days | End: 2022-12-30
Payer: MEDICARE

## 2022-12-30 DIAGNOSIS — Z98.890 OTHER SPECIFIED POSTPROCEDURAL STATES: Chronic | ICD-10-CM

## 2022-12-30 DIAGNOSIS — Z00.8 ENCOUNTER FOR OTHER GENERAL EXAMINATION: ICD-10-CM

## 2022-12-30 PROCEDURE — 77080 DXA BONE DENSITY AXIAL: CPT | Mod: 26

## 2022-12-30 PROCEDURE — 77080 DXA BONE DENSITY AXIAL: CPT

## 2022-12-30 NOTE — PROCEDURE
Addended by: RITO PEPPER on: 12/30/2022 02:13 PM     Modules accepted: Orders    
[de-identified] : Right and left knee sterilely prepped for her injection a cc Xylocaine 1 cc Kenalog. Well tolerated

## 2023-02-17 ENCOUNTER — OUTPATIENT (OUTPATIENT)
Dept: OUTPATIENT SERVICES | Facility: HOSPITAL | Age: 68
LOS: 1 days | End: 2023-02-17
Payer: MEDICARE

## 2023-02-17 ENCOUNTER — APPOINTMENT (OUTPATIENT)
Dept: MAMMOGRAPHY | Facility: IMAGING CENTER | Age: 68
End: 2023-02-17
Payer: MEDICARE

## 2023-02-17 DIAGNOSIS — Z98.890 OTHER SPECIFIED POSTPROCEDURAL STATES: Chronic | ICD-10-CM

## 2023-02-17 DIAGNOSIS — Z00.8 ENCOUNTER FOR OTHER GENERAL EXAMINATION: ICD-10-CM

## 2023-02-17 PROCEDURE — 77063 BREAST TOMOSYNTHESIS BI: CPT | Mod: 26

## 2023-02-17 PROCEDURE — 77067 SCR MAMMO BI INCL CAD: CPT

## 2023-02-17 PROCEDURE — 77063 BREAST TOMOSYNTHESIS BI: CPT

## 2023-02-17 PROCEDURE — 77067 SCR MAMMO BI INCL CAD: CPT | Mod: 26

## 2023-02-21 ENCOUNTER — NON-APPOINTMENT (OUTPATIENT)
Age: 68
End: 2023-02-21

## 2023-02-23 ENCOUNTER — APPOINTMENT (OUTPATIENT)
Dept: ULTRASOUND IMAGING | Facility: IMAGING CENTER | Age: 68
End: 2023-02-23
Payer: MEDICARE

## 2023-02-23 ENCOUNTER — OUTPATIENT (OUTPATIENT)
Dept: OUTPATIENT SERVICES | Facility: HOSPITAL | Age: 68
LOS: 1 days | End: 2023-02-23
Payer: MEDICARE

## 2023-02-23 DIAGNOSIS — Z00.8 ENCOUNTER FOR OTHER GENERAL EXAMINATION: ICD-10-CM

## 2023-02-23 DIAGNOSIS — Z98.890 OTHER SPECIFIED POSTPROCEDURAL STATES: Chronic | ICD-10-CM

## 2023-02-23 PROCEDURE — 76641 ULTRASOUND BREAST COMPLETE: CPT

## 2023-02-23 PROCEDURE — 76641 ULTRASOUND BREAST COMPLETE: CPT | Mod: 26,50,GY

## 2023-03-01 ENCOUNTER — APPOINTMENT (OUTPATIENT)
Dept: ORTHOPEDIC SURGERY | Facility: CLINIC | Age: 68
End: 2023-03-01
Payer: MEDICARE

## 2023-03-01 PROCEDURE — 73564 X-RAY EXAM KNEE 4 OR MORE: CPT | Mod: RT

## 2023-03-01 PROCEDURE — 99213 OFFICE O/P EST LOW 20 MIN: CPT

## 2023-03-01 NOTE — PHYSICAL EXAM
[de-identified] : Constitutional:Well nourished , well developed and in no acute distress\par Psychiatric: Alert and oriented to time place and person.Appropriate affect \par Skin:Head, neck, arms and lower extremities:no lesions or discoloration\par HEENT: Normocephalic, EOM intact, Nasal septum midline,\par Respiratory: Unlabored respirations,no audible wheezing ,no tachypnea, no cyanosis\par Cardiovascular: no leg swelling  no ankle edema no JVD, pulse regular\par Vascular: no calf or thigh tenderness, \par Peripheral pulses; intact\par Lymphatics:No groin adenopathy,no lymphedema lower  or upper extremities\par Right knee effusion 1+ flexion 0/110 ligaments intact peripheral pulses intact [de-identified] : X-rays right knee 4 views weightbearing demonstrate tricompartmental arthritis with medial compartment subchondral sclerosis joint space loss bone-on-bone contact varus alignment

## 2023-03-01 NOTE — HISTORY OF PRESENT ILLNESS
[de-identified] : 67y/o female presents with worsening right knee pain. Patient reports she twisted her right knee 2 weeks ago.Getting out of a car.  She began experience intermittent pain anterior and popliteal aspect of right knee provoked by weightbearing She also has longstanding history of recurrent bilateral knee pain secondary to osteoarthritis. She continues to have medial pain to bilateral knees. Pain is worsened with walking and climbing stairs. \par History of cortisone injections in January of 2019 and August 2019. She reports injections have given her only a few weeks of symptom relief each time. \par \par

## 2023-03-01 NOTE — DISCUSSION/SUMMARY
[de-identified] : Impression osteoarthritis right knee was with acute synovitis\par Plan NSAIDs Tylenol physical therapy follow-up 1 month

## 2023-03-29 ENCOUNTER — APPOINTMENT (OUTPATIENT)
Dept: ORTHOPEDIC SURGERY | Facility: CLINIC | Age: 68
End: 2023-03-29
Payer: MEDICARE

## 2023-03-29 VITALS — HEIGHT: 59 IN | WEIGHT: 158 LBS | BODY MASS INDEX: 31.85 KG/M2

## 2023-03-29 DIAGNOSIS — M17.11 UNILATERAL PRIMARY OSTEOARTHRITIS, RIGHT KNEE: ICD-10-CM

## 2023-03-29 PROCEDURE — 99213 OFFICE O/P EST LOW 20 MIN: CPT

## 2023-03-29 NOTE — HISTORY OF PRESENT ILLNESS
[de-identified] : Patient seen in follow-up for osteoarthritis left knee she has been undergoing physical therapy and reports decreased symptom severity

## 2023-03-29 NOTE — PHYSICAL EXAM
[de-identified] : Constitutional:Well nourished , well developed and in no acute distress\par Psychiatric: Alert and oriented to time place and person.Appropriate affect \par Skin:Head, neck, arms and lower extremities:no lesions or discoloration\par HEENT: Normocephalic, EOM intact, Nasal septum midline,\par Respiratory: Unlabored respirations,no audible wheezing ,no tachypnea, no cyanosis\par Cardiovascular: no leg swelling  no ankle edema no JVD, pulse regular\par Vascular: no calf or thigh tenderness, \par Peripheral pulses; intact\par Lymphatics:No groin adenopathy,no lymphedema lower  or upper extremities\par Right knee effusion 1+ flexion 0/110 ligaments intact peripheral pulses intact

## 2023-03-29 NOTE — DISCUSSION/SUMMARY
[de-identified] : Impression osteoarthritis right knee was with acute synovitis Improved\par PlanFollow-up 6 months

## 2023-05-02 NOTE — H&P PST ADULT - PAIN RATING AT REST
Topical Metronidazole Counseling: Metronidazole is a topical antibiotic medication. You may experience burning, stinging, redness, or allergic reactions.  Please call our office if you develop any problems from using this medication. 6

## 2023-09-28 ENCOUNTER — OUTPATIENT (OUTPATIENT)
Dept: OUTPATIENT SERVICES | Facility: HOSPITAL | Age: 68
LOS: 1 days | End: 2023-09-28

## 2023-09-28 ENCOUNTER — APPOINTMENT (OUTPATIENT)
Dept: RADIOLOGY | Facility: HOSPITAL | Age: 68
End: 2023-09-28
Payer: MEDICARE

## 2023-09-28 DIAGNOSIS — Z98.890 OTHER SPECIFIED POSTPROCEDURAL STATES: Chronic | ICD-10-CM

## 2023-09-28 DIAGNOSIS — K44.9 DIAPHRAGMATIC HERNIA WITHOUT OBSTRUCTION OR GANGRENE: ICD-10-CM

## 2023-09-28 DIAGNOSIS — R13.10 DYSPHAGIA, UNSPECIFIED: ICD-10-CM

## 2023-09-28 DIAGNOSIS — R12 HEARTBURN: ICD-10-CM

## 2023-09-28 PROCEDURE — 74220 X-RAY XM ESOPHAGUS 1CNTRST: CPT | Mod: 26

## 2023-10-11 ENCOUNTER — APPOINTMENT (OUTPATIENT)
Dept: ORTHOPEDIC SURGERY | Facility: CLINIC | Age: 68
End: 2023-10-11
Payer: MEDICARE

## 2023-10-11 PROCEDURE — 99213 OFFICE O/P EST LOW 20 MIN: CPT

## 2023-11-22 PROBLEM — I10 HYPERTENSION: Status: ACTIVE | Noted: 2017-05-12

## 2023-11-23 PROBLEM — K44.9 HIATAL HERNIA: Status: ACTIVE | Noted: 2023-11-22

## 2023-11-30 ENCOUNTER — NON-APPOINTMENT (OUTPATIENT)
Age: 68
End: 2023-11-30

## 2023-11-30 ENCOUNTER — APPOINTMENT (OUTPATIENT)
Dept: THORACIC SURGERY | Facility: CLINIC | Age: 68
End: 2023-11-30
Payer: MEDICARE

## 2023-11-30 VITALS
OXYGEN SATURATION: 99 % | DIASTOLIC BLOOD PRESSURE: 84 MMHG | HEART RATE: 77 BPM | RESPIRATION RATE: 17 BRPM | BODY MASS INDEX: 31.45 KG/M2 | WEIGHT: 156 LBS | SYSTOLIC BLOOD PRESSURE: 122 MMHG | HEIGHT: 59 IN

## 2023-11-30 DIAGNOSIS — Z86.59 PERSONAL HISTORY OF OTHER MENTAL AND BEHAVIORAL DISORDERS: ICD-10-CM

## 2023-11-30 DIAGNOSIS — K44.9 DIAPHRAGMATIC HERNIA W/OUT OBSTRUCTION OR GANGRENE: ICD-10-CM

## 2023-11-30 DIAGNOSIS — Z98.890 OTHER SPECIFIED POSTPROCEDURAL STATES: ICD-10-CM

## 2023-11-30 DIAGNOSIS — Z87.09 PERSONAL HISTORY OF OTHER DISEASES OF THE RESPIRATORY SYSTEM: ICD-10-CM

## 2023-11-30 DIAGNOSIS — Z87.891 PERSONAL HISTORY OF NICOTINE DEPENDENCE: ICD-10-CM

## 2023-11-30 DIAGNOSIS — M65.319 TRIGGER THUMB, UNSPECIFIED THUMB: ICD-10-CM

## 2023-11-30 DIAGNOSIS — I10 ESSENTIAL (PRIMARY) HYPERTENSION: ICD-10-CM

## 2023-11-30 DIAGNOSIS — Z87.828 PERSONAL HISTORY OF OTHER (HEALED) PHYSICAL INJURY AND TRAUMA: ICD-10-CM

## 2023-11-30 PROCEDURE — 99205 OFFICE O/P NEW HI 60 MIN: CPT

## 2023-12-01 ENCOUNTER — NON-APPOINTMENT (OUTPATIENT)
Age: 68
End: 2023-12-01

## 2023-12-01 PROBLEM — Z87.09 HISTORY OF ASTHMA: Status: RESOLVED | Noted: 2023-12-01 | Resolved: 2023-12-01

## 2023-12-01 PROBLEM — M65.319 TRIGGER THUMB: Status: RESOLVED | Noted: 2023-12-01 | Resolved: 2023-12-01

## 2023-12-01 PROBLEM — Z87.891 FORMER SMOKER: Status: ACTIVE | Noted: 2017-05-12

## 2023-12-01 PROBLEM — Z87.828 HISTORY OF TORN MENISCUS OF KNEE: Status: RESOLVED | Noted: 2023-12-01 | Resolved: 2023-12-01

## 2023-12-01 PROBLEM — Z86.59 HISTORY OF ANXIETY: Status: RESOLVED | Noted: 2023-12-01 | Resolved: 2023-12-01

## 2023-12-01 PROBLEM — Z98.890 H/O DILATION AND CURETTAGE: Status: RESOLVED | Noted: 2023-12-01 | Resolved: 2023-12-01

## 2023-12-01 RX ORDER — SERTRALINE HYDROCHLORIDE 150 MG/1
CAPSULE ORAL
Refills: 0 | Status: ACTIVE | COMMUNITY

## 2024-02-29 ENCOUNTER — APPOINTMENT (OUTPATIENT)
Dept: ULTRASOUND IMAGING | Facility: IMAGING CENTER | Age: 69
End: 2024-02-29
Payer: MEDICARE

## 2024-02-29 ENCOUNTER — APPOINTMENT (OUTPATIENT)
Dept: MAMMOGRAPHY | Facility: IMAGING CENTER | Age: 69
End: 2024-02-29
Payer: MEDICARE

## 2024-02-29 ENCOUNTER — OUTPATIENT (OUTPATIENT)
Dept: OUTPATIENT SERVICES | Facility: HOSPITAL | Age: 69
LOS: 1 days | End: 2024-02-29
Payer: MEDICARE

## 2024-02-29 DIAGNOSIS — Z98.890 OTHER SPECIFIED POSTPROCEDURAL STATES: Chronic | ICD-10-CM

## 2024-02-29 DIAGNOSIS — Z00.8 ENCOUNTER FOR OTHER GENERAL EXAMINATION: ICD-10-CM

## 2024-02-29 PROCEDURE — 76641 ULTRASOUND BREAST COMPLETE: CPT | Mod: 26,50,GY

## 2024-02-29 PROCEDURE — 77067 SCR MAMMO BI INCL CAD: CPT

## 2024-02-29 PROCEDURE — 77063 BREAST TOMOSYNTHESIS BI: CPT | Mod: 26

## 2024-02-29 PROCEDURE — 77063 BREAST TOMOSYNTHESIS BI: CPT

## 2024-02-29 PROCEDURE — 77067 SCR MAMMO BI INCL CAD: CPT | Mod: 26

## 2024-02-29 PROCEDURE — 76641 ULTRASOUND BREAST COMPLETE: CPT

## 2024-04-10 ENCOUNTER — APPOINTMENT (OUTPATIENT)
Dept: ORTHOPEDIC SURGERY | Facility: CLINIC | Age: 69
End: 2024-04-10
Payer: MEDICARE

## 2024-04-10 VITALS — HEIGHT: 59 IN | BODY MASS INDEX: 30.24 KG/M2 | WEIGHT: 150 LBS

## 2024-04-10 DIAGNOSIS — M16.11 UNILATERAL PRIMARY OSTEOARTHRITIS, RIGHT HIP: ICD-10-CM

## 2024-04-10 DIAGNOSIS — M17.12 UNILATERAL PRIMARY OSTEOARTHRITIS, LEFT KNEE: ICD-10-CM

## 2024-04-10 DIAGNOSIS — M17.11 UNILATERAL PRIMARY OSTEOARTHRITIS, RIGHT KNEE: ICD-10-CM

## 2024-04-10 PROCEDURE — 99214 OFFICE O/P EST MOD 30 MIN: CPT

## 2024-04-10 PROCEDURE — 73564 X-RAY EXAM KNEE 4 OR MORE: CPT | Mod: 50

## 2024-04-10 NOTE — ADDENDUM
[FreeTextEntry1] : I, Thi Ugarte, acted solely as a scribe for Dr. Seymour Gaines MD on this date  04/10/2024  All medical record entries made by the Scribe were at my, Dr. Seymour Gaines MD , direction and personally dictated by me on 04/10/2024. I have reviewed the chart and agree that the record accurately reflects my personal performance of the history, physical exam, assessment and plan. I have also personally directed, reviewed, and agreed with the chart.

## 2024-04-10 NOTE — HISTORY OF PRESENT ILLNESS
[de-identified] : DENZEL ARAGON is a 69 year old female who presents for follow up evaluation of osteoarthritis right knee. Patient presents ambulating independently. She has been undergoing physical therapy and reports decreased symptom severity. She reports pain is worse with going down the stairs. Occasional Advil with mild to moderate relief. Reports knee pain is manageable. Since her last visit on 10/11/2023, she reports worsening right knee pain. She's exacerbated all conservative management options of treatment with no relief. She would like to discuss surgery for ~September.

## 2024-04-10 NOTE — PHYSICAL EXAM
[de-identified] : Constitutional:Well nourished , well developed and in no acute distress Psychiatric: Alert and oriented to time place and person.Appropriate affect  Skin:Head, neck, arms and lower extremities:no lesions or discoloration HEENT: Normocephalic, EOM intact, Nasal septum midline, Respiratory: Unlabored respirations,no audible wheezing ,no tachypnea, no cyanosis Cardiovascular: no leg swelling  no ankle edema no JVD, pulse regular Vascular: no calf or thigh tenderness,  Peripheral pulses; intact Lymphatics:No groin adenopathy,no lymphedema lower  or upper extremities  Left Knee Exam: Inspection/Palpation : no tenderness to palpation, no swelling, varus alignment Range of Motion : 0 - 120 degrees,  crepitus Stability : no valgus or varus instability present on provocative testing, Lachmans Test (-) Sensation : sensation to pin intact Vascular Exam : no edema, no cyanosis, dorsalis pedis artery pulse 1+ Skin : no erythema, no ecchymosis   Right Knee Exam: Inspection/Palpation : no tenderness to palpation, no swelling, varus alignment Range of Motion : 0 - 115 degrees, crepitus Stability : no valgus or varus instability present on provocative testing, Lachmans Test (-) Sensation : sensation to pin intact Vascular Exam : no edema, no cyanosis, dorsalis pedis artery pulse 2+ Skin : no erythema, no ecchymosis [de-identified] : o 4 views of the  RIGHT & LEFT knee obtained today 04/10/2024 demonstrates end stage tricompartmental osteoarthritis of the right and left knees.

## 2024-04-10 NOTE — DISCUSSION/SUMMARY
[de-identified] : 69 year old presents with end stage RIGHT knee osteoarthritis with debilitating RIGHT knee pain that is unresponsive to comprehensive conservative management and compromising quality of life. The conditions and treatment options have been discussed with the patient. Given X-rays, failure of prior conservative treatment including physical therapy, NSAIDs, cortisone injections and persistent pain at rest, the patient understands that I recommend total knee replacement as their best option for long term pain relief. We discussed total knee replacement, including risks, benefits and alternatives, and patient would like to proceed with surgery. I reviewed the plan of care as well as a model of a knee implant equivalent to the one that will be used for their total knee joint replacement. The patient participated in an interactive discussion of the TKR implant planned for their surgery with questions answered, agreed with the treatment plan, and has decided to move forward with elective TKR with Reji robotic assistance as planned. Implant brand choices were offered to the patient. The patient understands the risks and agrees to the plan of care as well as the use of implants for their total knee replacement. A pre-operative CT Scan of the RIGHT knee will be ordered to assess bone loss or deformities.  Patient will follow up when she chooses to proceed with a RIGHT total knee replacement.  Patient will require a rolling walker as this is needed for activities of daily living within their home secondary to the diagnosis of osteoarthritis and post-surgical ambulation. A 3-in-1 commode for bedside use is also required, as the patient has no ability to access the bathroom in their home.  Dr. Seymour Gaines MD

## 2024-07-22 DIAGNOSIS — Z13.83 ENCOUNTER FOR SCREENING FOR RESPIRATORY DISORDER NEC: ICD-10-CM

## 2024-07-23 ENCOUNTER — NON-APPOINTMENT (OUTPATIENT)
Age: 69
End: 2024-07-23

## 2024-08-13 ENCOUNTER — APPOINTMENT (OUTPATIENT)
Dept: PULMONOLOGY | Facility: CLINIC | Age: 69
End: 2024-08-13
Payer: MEDICARE

## 2024-08-13 VITALS
HEART RATE: 80 BPM | BODY MASS INDEX: 30.84 KG/M2 | DIASTOLIC BLOOD PRESSURE: 87 MMHG | OXYGEN SATURATION: 99 % | SYSTOLIC BLOOD PRESSURE: 125 MMHG | WEIGHT: 153 LBS | HEIGHT: 59 IN

## 2024-08-13 DIAGNOSIS — R91.8 OTHER NONSPECIFIC ABNORMAL FINDING OF LUNG FIELD: ICD-10-CM

## 2024-08-13 DIAGNOSIS — J45.30 MILD PERSISTENT ASTHMA, UNCOMPLICATED: ICD-10-CM

## 2024-08-13 PROCEDURE — ZZZZZ: CPT

## 2024-08-13 PROCEDURE — 94729 DIFFUSING CAPACITY: CPT

## 2024-08-13 PROCEDURE — 94060 EVALUATION OF WHEEZING: CPT

## 2024-08-13 PROCEDURE — 94726 PLETHYSMOGRAPHY LUNG VOLUMES: CPT

## 2024-08-13 PROCEDURE — 99204 OFFICE O/P NEW MOD 45 MIN: CPT | Mod: 25

## 2024-08-13 RX ORDER — BUDESONIDE AND FORMOTEROL FUMARATE DIHYDRATE 80; 4.5 UG/1; UG/1
80-4.5 AEROSOL RESPIRATORY (INHALATION) TWICE DAILY
Qty: 1 | Refills: 11 | Status: ACTIVE | COMMUNITY
Start: 2024-08-13 | End: 1900-01-01

## 2024-08-13 NOTE — ASSESSMENT
[FreeTextEntry1] : 1. Mild asthma: PFTs normal, no recent ER visits or hospitalizations, patient does remember at least one steroid use for exacerbation in past year. Discussed MART therapy in detail, which patient is willing to try. RX for symbicort prn sent to pharmacy.  2. Abnormal CT: Recommended repeat at [facility] in December. CD from July uploaded into our system.

## 2024-08-13 NOTE — HISTORY OF PRESENT ILLNESS
[Never] : never [Former] : former [TextBox_4] : Patient with hx of asthma since 30's, no hosptialzations or ER visits. Had been seeing allergist and was reasonably well controlled on Flovent 110, two puffs nightly and montelukast. However a few weeks ago, with change in weather noticed worsening wheezing, went to PMD and told to try Trelegy.  She noticed a sore throat with trelegy and no improvement in symptoms. Has needed to use rescue inhaler (levalbuterol) about 4 times in past two weeks.  She notes no obvious precipitating factors to her asthma other than changes in weather. Has a long hx of allergic rhinitis, had been on allergy immunotherapy in the past. No pets anymore at home.   SHe had RSV infection in December, and wonders if that has caused any lasting damage to her lungs.  Had CT chest in January which showed multiple nodules, which have been stable on further imaging in April and July.   She notes chronic cough which has improved with treatment for GERD with omeprazole and famotidine.  [TextBox_11] : 0.1 [TextBox_13] : 20 [YearQuit] : 1980

## 2024-10-07 ENCOUNTER — NON-APPOINTMENT (OUTPATIENT)
Age: 69
End: 2024-10-07

## 2024-10-07 ENCOUNTER — APPOINTMENT (OUTPATIENT)
Dept: PULMONOLOGY | Facility: CLINIC | Age: 69
End: 2024-10-07
Payer: MEDICARE

## 2024-10-07 VITALS
RESPIRATION RATE: 16 BRPM | DIASTOLIC BLOOD PRESSURE: 80 MMHG | SYSTOLIC BLOOD PRESSURE: 122 MMHG | WEIGHT: 154 LBS | OXYGEN SATURATION: 98 % | HEIGHT: 59 IN | BODY MASS INDEX: 31.04 KG/M2 | HEART RATE: 75 BPM

## 2024-10-07 DIAGNOSIS — J45.30 MILD PERSISTENT ASTHMA, UNCOMPLICATED: ICD-10-CM

## 2024-10-07 PROCEDURE — 99214 OFFICE O/P EST MOD 30 MIN: CPT

## 2024-10-07 PROCEDURE — G2211 COMPLEX E/M VISIT ADD ON: CPT

## 2024-12-16 ENCOUNTER — APPOINTMENT (OUTPATIENT)
Dept: CT IMAGING | Facility: IMAGING CENTER | Age: 69
End: 2024-12-16
Payer: MEDICARE

## 2024-12-16 ENCOUNTER — OUTPATIENT (OUTPATIENT)
Dept: OUTPATIENT SERVICES | Facility: HOSPITAL | Age: 69
LOS: 1 days | End: 2024-12-16
Payer: MEDICARE

## 2024-12-16 DIAGNOSIS — Z98.890 OTHER SPECIFIED POSTPROCEDURAL STATES: Chronic | ICD-10-CM

## 2024-12-16 DIAGNOSIS — R91.8 OTHER NONSPECIFIC ABNORMAL FINDING OF LUNG FIELD: ICD-10-CM

## 2024-12-16 PROCEDURE — 71250 CT THORAX DX C-: CPT

## 2024-12-16 PROCEDURE — 71250 CT THORAX DX C-: CPT | Mod: 26,MH

## 2024-12-30 ENCOUNTER — APPOINTMENT (OUTPATIENT)
Dept: PULMONOLOGY | Facility: CLINIC | Age: 69
End: 2024-12-30
Payer: MEDICARE

## 2024-12-30 VITALS
HEART RATE: 83 BPM | BODY MASS INDEX: 31.85 KG/M2 | SYSTOLIC BLOOD PRESSURE: 116 MMHG | RESPIRATION RATE: 17 BRPM | DIASTOLIC BLOOD PRESSURE: 78 MMHG | HEIGHT: 59 IN | WEIGHT: 158 LBS | OXYGEN SATURATION: 95 %

## 2024-12-30 DIAGNOSIS — J45.30 MILD PERSISTENT ASTHMA, UNCOMPLICATED: ICD-10-CM

## 2024-12-30 PROCEDURE — 99214 OFFICE O/P EST MOD 30 MIN: CPT

## 2024-12-30 RX ORDER — MONTELUKAST 10 MG/1
10 TABLET, FILM COATED ORAL
Qty: 1 | Refills: 3 | Status: ACTIVE | COMMUNITY
Start: 2024-12-30 | End: 1900-01-01

## 2025-01-03 ENCOUNTER — APPOINTMENT (OUTPATIENT)
Dept: RADIOLOGY | Facility: IMAGING CENTER | Age: 70
End: 2025-01-03
Payer: COMMERCIAL

## 2025-01-03 ENCOUNTER — OUTPATIENT (OUTPATIENT)
Dept: OUTPATIENT SERVICES | Facility: HOSPITAL | Age: 70
LOS: 1 days | End: 2025-01-03
Payer: MEDICARE

## 2025-01-03 DIAGNOSIS — Z00.8 ENCOUNTER FOR OTHER GENERAL EXAMINATION: ICD-10-CM

## 2025-01-03 DIAGNOSIS — Z98.890 OTHER SPECIFIED POSTPROCEDURAL STATES: Chronic | ICD-10-CM

## 2025-01-03 PROCEDURE — 77080 DXA BONE DENSITY AXIAL: CPT | Mod: 26

## 2025-01-03 PROCEDURE — 77080 DXA BONE DENSITY AXIAL: CPT

## 2025-02-21 NOTE — ASU PREOPERATIVE ASSESSMENT, ADULT ([facility]ARK ONLY) - ESCORT HOME
- Neurochecks Q4 hours  - Dispo PACU --> Floor, likely home tomorrow if patient doing well   - Ancef x 24 hours  - Pain control   - Advance diet as tolerated  - Increase activity as tolerated    [id number]    Case discussed with attending neurosurgeon Dr. Jimenez Preop for today  Keep NPO  IV fluids  Pain control Pain control  Increase activity  Likely discharge home today spouse

## 2025-03-11 ENCOUNTER — OUTPATIENT (OUTPATIENT)
Dept: OUTPATIENT SERVICES | Facility: HOSPITAL | Age: 70
LOS: 1 days | End: 2025-03-11
Payer: MEDICARE

## 2025-03-11 ENCOUNTER — APPOINTMENT (OUTPATIENT)
Dept: MAMMOGRAPHY | Facility: IMAGING CENTER | Age: 70
End: 2025-03-11
Payer: MEDICARE

## 2025-03-11 DIAGNOSIS — Z98.890 OTHER SPECIFIED POSTPROCEDURAL STATES: Chronic | ICD-10-CM

## 2025-03-11 DIAGNOSIS — Z00.8 ENCOUNTER FOR OTHER GENERAL EXAMINATION: ICD-10-CM

## 2025-03-11 PROCEDURE — 77067 SCR MAMMO BI INCL CAD: CPT | Mod: 26

## 2025-03-11 PROCEDURE — 77063 BREAST TOMOSYNTHESIS BI: CPT | Mod: 26

## 2025-03-11 PROCEDURE — 77063 BREAST TOMOSYNTHESIS BI: CPT

## 2025-03-11 PROCEDURE — 77067 SCR MAMMO BI INCL CAD: CPT

## 2025-04-02 NOTE — H&P PST ADULT - NS PRO ABUSE SCREEN AFRAID ANYONE YN
Your imaging today shows pulmonary nodular changes.  Discuss this with your primary physician at follow up and consider repeat imaging.   
no

## 2025-05-21 ENCOUNTER — OUTPATIENT (OUTPATIENT)
Dept: OUTPATIENT SERVICES | Facility: HOSPITAL | Age: 70
LOS: 1 days | End: 2025-05-21
Payer: MEDICARE

## 2025-05-21 ENCOUNTER — APPOINTMENT (OUTPATIENT)
Dept: MRI IMAGING | Facility: IMAGING CENTER | Age: 70
End: 2025-05-21
Payer: MEDICARE

## 2025-05-21 DIAGNOSIS — Z98.890 OTHER SPECIFIED POSTPROCEDURAL STATES: Chronic | ICD-10-CM

## 2025-05-21 DIAGNOSIS — R42 DIZZINESS AND GIDDINESS: ICD-10-CM

## 2025-05-21 PROCEDURE — 70551 MRI BRAIN STEM W/O DYE: CPT | Mod: 26

## 2025-05-21 PROCEDURE — 70551 MRI BRAIN STEM W/O DYE: CPT | Mod: MH

## 2025-05-28 ENCOUNTER — NON-APPOINTMENT (OUTPATIENT)
Age: 70
End: 2025-05-28

## 2025-06-16 ENCOUNTER — NON-APPOINTMENT (OUTPATIENT)
Age: 70
End: 2025-06-16

## 2025-06-17 ENCOUNTER — APPOINTMENT (OUTPATIENT)
Dept: PULMONOLOGY | Facility: CLINIC | Age: 70
End: 2025-06-17
Payer: MEDICARE

## 2025-06-17 VITALS
BODY MASS INDEX: 30.24 KG/M2 | OXYGEN SATURATION: 97 % | RESPIRATION RATE: 16 BRPM | WEIGHT: 150 LBS | HEART RATE: 79 BPM | HEIGHT: 59 IN

## 2025-06-17 PROCEDURE — 99214 OFFICE O/P EST MOD 30 MIN: CPT

## 2025-07-12 ENCOUNTER — OUTPATIENT (OUTPATIENT)
Dept: OUTPATIENT SERVICES | Facility: HOSPITAL | Age: 70
LOS: 1 days | End: 2025-07-12
Payer: MEDICARE

## 2025-07-12 ENCOUNTER — APPOINTMENT (OUTPATIENT)
Dept: RADIOLOGY | Facility: IMAGING CENTER | Age: 70
End: 2025-07-12

## 2025-07-12 DIAGNOSIS — Z98.890 OTHER SPECIFIED POSTPROCEDURAL STATES: Chronic | ICD-10-CM

## 2025-07-12 DIAGNOSIS — M25.562 PAIN IN LEFT KNEE: ICD-10-CM

## 2025-07-12 PROCEDURE — 73564 X-RAY EXAM KNEE 4 OR MORE: CPT | Mod: 26,LT

## 2025-07-12 PROCEDURE — 73564 X-RAY EXAM KNEE 4 OR MORE: CPT

## 2025-08-04 ENCOUNTER — APPOINTMENT (OUTPATIENT)
Dept: ORTHOPEDIC SURGERY | Facility: CLINIC | Age: 70
End: 2025-08-04
Payer: MEDICARE

## 2025-08-04 VITALS — BODY MASS INDEX: 30.24 KG/M2 | WEIGHT: 150 LBS | HEIGHT: 59 IN

## 2025-08-04 DIAGNOSIS — Z00.00 ENCOUNTER FOR GENERAL ADULT MEDICAL EXAMINATION W/OUT ABNORMAL FINDINGS: ICD-10-CM

## 2025-08-04 DIAGNOSIS — M17.11 UNILATERAL PRIMARY OSTEOARTHRITIS, RIGHT KNEE: ICD-10-CM

## 2025-08-04 PROCEDURE — 99214 OFFICE O/P EST MOD 30 MIN: CPT

## 2025-08-04 PROCEDURE — 73564 X-RAY EXAM KNEE 4 OR MORE: CPT | Mod: RT

## 2025-08-04 PROCEDURE — 73630 X-RAY EXAM OF FOOT: CPT | Mod: RT

## 2025-08-06 ENCOUNTER — APPOINTMENT (OUTPATIENT)
Dept: PODIATRY | Facility: CLINIC | Age: 70
End: 2025-08-06

## 2025-08-06 DIAGNOSIS — S92.911A UNSPECIFIED FRACTURE OF RIGHT TOE(S), INITIAL ENCOUNTER FOR CLOSED FRACTURE: ICD-10-CM

## 2025-08-06 DIAGNOSIS — S90.121A CONTUSION OF RIGHT LESSER TOE(S) W/OUT DAMAGE TO NAIL, INITIAL ENCOUNTER: ICD-10-CM

## 2025-08-06 DIAGNOSIS — M79.676 PAIN IN UNSPECIFIED TOE(S): ICD-10-CM

## 2025-08-06 PROCEDURE — 73630 X-RAY EXAM OF FOOT: CPT | Mod: RT

## 2025-08-06 PROCEDURE — 99203 OFFICE O/P NEW LOW 30 MIN: CPT

## 2025-08-15 ENCOUNTER — NON-APPOINTMENT (OUTPATIENT)
Age: 70
End: 2025-08-15

## 2025-08-18 PROBLEM — S90.121A CONTUSION OF TOE, RIGHT: Status: ACTIVE | Noted: 2025-08-08

## 2025-08-18 PROBLEM — S92.911A TOE FRACTURE, RIGHT: Status: ACTIVE | Noted: 2025-08-08

## 2025-08-18 PROBLEM — M79.676 PAIN OF FIFTH TOE: Status: ACTIVE | Noted: 2025-08-08

## 2025-09-04 ENCOUNTER — OUTPATIENT (OUTPATIENT)
Dept: OUTPATIENT SERVICES | Facility: HOSPITAL | Age: 70
LOS: 1 days | End: 2025-09-04
Payer: MEDICARE

## 2025-09-04 ENCOUNTER — APPOINTMENT (OUTPATIENT)
Dept: CT IMAGING | Facility: CLINIC | Age: 70
End: 2025-09-04
Payer: MEDICARE

## 2025-09-04 DIAGNOSIS — Z98.890 OTHER SPECIFIED POSTPROCEDURAL STATES: Chronic | ICD-10-CM

## 2025-09-04 DIAGNOSIS — M17.11 UNILATERAL PRIMARY OSTEOARTHRITIS, RIGHT KNEE: ICD-10-CM

## 2025-09-04 PROCEDURE — 73700 CT LOWER EXTREMITY W/O DYE: CPT | Mod: 26,RT

## 2025-09-04 PROCEDURE — 73700 CT LOWER EXTREMITY W/O DYE: CPT

## 2025-09-19 ENCOUNTER — APPOINTMENT (OUTPATIENT)
Dept: PODIATRY | Facility: CLINIC | Age: 70
End: 2025-09-19

## 2025-09-19 DIAGNOSIS — S90.121A CONTUSION OF RIGHT LESSER TOE(S) W/OUT DAMAGE TO NAIL, INITIAL ENCOUNTER: ICD-10-CM

## 2025-09-19 DIAGNOSIS — M79.676 PAIN IN UNSPECIFIED TOE(S): ICD-10-CM
